# Patient Record
Sex: MALE | Race: WHITE | NOT HISPANIC OR LATINO | URBAN - METROPOLITAN AREA
[De-identification: names, ages, dates, MRNs, and addresses within clinical notes are randomized per-mention and may not be internally consistent; named-entity substitution may affect disease eponyms.]

---

## 2017-10-18 ENCOUNTER — AMBULATORY - HEALTHEAST (OUTPATIENT)
Dept: ADMINISTRATIVE | Facility: CLINIC | Age: 67
End: 2017-10-18

## 2017-10-18 RX ORDER — SIMVASTATIN 20 MG
20 TABLET ORAL AT BEDTIME
Status: SHIPPED | COMMUNITY
Start: 2017-10-18

## 2017-10-18 RX ORDER — LEVOTHYROXINE SODIUM 50 UG/1
50 TABLET ORAL DAILY
Status: SHIPPED | COMMUNITY
Start: 2017-10-18

## 2017-10-18 RX ORDER — DULOXETIN HYDROCHLORIDE 30 MG/1
30 CAPSULE, DELAYED RELEASE ORAL DAILY
Status: SHIPPED | COMMUNITY
Start: 2017-10-18

## 2017-10-18 RX ORDER — MAGNESIUM OXIDE 400 MG/1
400 TABLET ORAL DAILY
Status: SHIPPED | COMMUNITY
Start: 2017-10-18

## 2017-10-18 RX ORDER — ASPIRIN 81 MG/1
81 TABLET, CHEWABLE ORAL DAILY
Status: SHIPPED | COMMUNITY
Start: 2017-10-18

## 2017-10-18 RX ORDER — GABAPENTIN 300 MG/1
300 CAPSULE ORAL 3 TIMES DAILY
Status: SHIPPED | COMMUNITY
Start: 2017-10-18

## 2017-10-18 RX ORDER — FOLIC ACID 1 MG/1
1 TABLET ORAL DAILY
Status: SHIPPED | COMMUNITY
Start: 2017-10-18

## 2017-10-18 RX ORDER — INSULIN GLARGINE 100 [IU]/ML
12 INJECTION, SOLUTION SUBCUTANEOUS DAILY
Status: SHIPPED | COMMUNITY
Start: 2017-10-18

## 2017-10-18 RX ORDER — LANOLIN ALCOHOL/MO/W.PET/CERES
100 CREAM (GRAM) TOPICAL DAILY
Status: SHIPPED | COMMUNITY
Start: 2017-10-18

## 2017-10-20 ENCOUNTER — OFFICE VISIT - HEALTHEAST (OUTPATIENT)
Dept: GERIATRICS | Facility: CLINIC | Age: 67
End: 2017-10-20

## 2017-10-20 DIAGNOSIS — E03.9 HYPOTHYROIDISM: ICD-10-CM

## 2017-10-20 DIAGNOSIS — E10.8 DM TYPE 1 CAUSING COMPLICATION (H): ICD-10-CM

## 2017-10-20 DIAGNOSIS — L97.329: ICD-10-CM

## 2017-10-20 DIAGNOSIS — F32.A DEPRESSION: ICD-10-CM

## 2017-10-20 DIAGNOSIS — E78.5 HYPERLIPIDEMIA: ICD-10-CM

## 2017-10-21 ENCOUNTER — OFFICE VISIT - HEALTHEAST (OUTPATIENT)
Dept: GERIATRICS | Facility: CLINIC | Age: 67
End: 2017-10-21

## 2017-10-21 DIAGNOSIS — E87.5 HYPERKALEMIA: ICD-10-CM

## 2017-10-24 ENCOUNTER — OFFICE VISIT - HEALTHEAST (OUTPATIENT)
Dept: GERIATRICS | Facility: CLINIC | Age: 67
End: 2017-10-24

## 2017-10-24 DIAGNOSIS — E11.9 INSULIN-REQUIRING OR DEPENDENT TYPE II DIABETES MELLITUS (H): ICD-10-CM

## 2017-10-24 DIAGNOSIS — E03.9 HYPOTHYROIDISM: ICD-10-CM

## 2017-10-24 DIAGNOSIS — E11.610 CHARCOT FOOT DUE TO DIABETES MELLITUS (H): ICD-10-CM

## 2017-10-24 DIAGNOSIS — N17.9 AKI (ACUTE KIDNEY INJURY) (H): ICD-10-CM

## 2017-10-24 DIAGNOSIS — L97.329: ICD-10-CM

## 2017-10-24 DIAGNOSIS — Z79.4 INSULIN-REQUIRING OR DEPENDENT TYPE II DIABETES MELLITUS (H): ICD-10-CM

## 2017-10-24 DIAGNOSIS — E78.5 HYPERLIPIDEMIA: ICD-10-CM

## 2017-11-01 ENCOUNTER — OFFICE VISIT - HEALTHEAST (OUTPATIENT)
Dept: GERIATRICS | Facility: CLINIC | Age: 67
End: 2017-11-01

## 2017-11-01 DIAGNOSIS — M86.9 OSTEOMYELITIS (H): ICD-10-CM

## 2017-11-01 DIAGNOSIS — N17.9 AKI (ACUTE KIDNEY INJURY) (H): ICD-10-CM

## 2017-11-01 DIAGNOSIS — R33.9 URINARY RETENTION: ICD-10-CM

## 2017-11-01 DIAGNOSIS — E10.9 TYPE 1 DIABETES MELLITUS (H): ICD-10-CM

## 2017-11-01 DIAGNOSIS — F32.A DEPRESSION: ICD-10-CM

## 2017-11-06 ENCOUNTER — RECORDS - HEALTHEAST (OUTPATIENT)
Dept: LAB | Facility: CLINIC | Age: 67
End: 2017-11-06

## 2017-11-06 LAB
ALT SERPL W P-5'-P-CCNC: 55 U/L (ref 0–45)
AST SERPL W P-5'-P-CCNC: 34 U/L (ref 0–40)
CREAT SERPL-MCNC: 0.99 MG/DL (ref 0.7–1.3)
GFR SERPL CREATININE-BSD FRML MDRD: >60 ML/MIN/1.73M2

## 2017-11-10 ENCOUNTER — OFFICE VISIT - HEALTHEAST (OUTPATIENT)
Dept: GERIATRICS | Facility: CLINIC | Age: 67
End: 2017-11-10

## 2017-11-10 DIAGNOSIS — N40.0 BPH (BENIGN PROSTATIC HYPERPLASIA): ICD-10-CM

## 2017-11-10 DIAGNOSIS — M86.9: ICD-10-CM

## 2017-11-10 DIAGNOSIS — E78.5 HYPERLIPIDEMIA: ICD-10-CM

## 2017-11-10 DIAGNOSIS — E11.9 DIABETES MELLITUS (H): ICD-10-CM

## 2017-11-10 DIAGNOSIS — E03.9 HYPOTHYROIDISM: ICD-10-CM

## 2017-11-10 DIAGNOSIS — R53.1 GENERAL WEAKNESS: ICD-10-CM

## 2017-11-10 DIAGNOSIS — I10 HYPERTENSION: ICD-10-CM

## 2017-11-13 ENCOUNTER — RECORDS - HEALTHEAST (OUTPATIENT)
Dept: LAB | Facility: CLINIC | Age: 67
End: 2017-11-13

## 2017-11-13 LAB
ALT SERPL W P-5'-P-CCNC: 36 U/L (ref 0–45)
AST SERPL W P-5'-P-CCNC: 33 U/L (ref 0–40)
CREAT SERPL-MCNC: 1.03 MG/DL (ref 0.7–1.3)
GFR SERPL CREATININE-BSD FRML MDRD: >60 ML/MIN/1.73M2

## 2017-11-14 ENCOUNTER — OFFICE VISIT - HEALTHEAST (OUTPATIENT)
Dept: GERIATRICS | Facility: CLINIC | Age: 67
End: 2017-11-14

## 2017-11-14 DIAGNOSIS — M86.9: ICD-10-CM

## 2017-11-14 DIAGNOSIS — I10 HYPERTENSION: ICD-10-CM

## 2017-11-14 DIAGNOSIS — R53.81 PHYSICAL DEBILITY: ICD-10-CM

## 2017-11-14 DIAGNOSIS — E11.9 DIABETES MELLITUS (H): ICD-10-CM

## 2017-11-20 ENCOUNTER — RECORDS - HEALTHEAST (OUTPATIENT)
Dept: LAB | Facility: CLINIC | Age: 67
End: 2017-11-20

## 2017-11-20 LAB
ALT SERPL W P-5'-P-CCNC: 40 U/L (ref 0–45)
AST SERPL W P-5'-P-CCNC: 32 U/L (ref 0–40)
CREAT SERPL-MCNC: 1.1 MG/DL (ref 0.7–1.3)
GFR SERPL CREATININE-BSD FRML MDRD: >60 ML/MIN/1.73M2

## 2017-11-21 ENCOUNTER — OFFICE VISIT - HEALTHEAST (OUTPATIENT)
Dept: GERIATRICS | Facility: CLINIC | Age: 67
End: 2017-11-21

## 2017-11-21 DIAGNOSIS — E11.9 DIABETES MELLITUS (H): ICD-10-CM

## 2017-11-21 DIAGNOSIS — R53.81 PHYSICAL DEBILITY: ICD-10-CM

## 2017-11-21 DIAGNOSIS — M86.9: ICD-10-CM

## 2017-11-21 DIAGNOSIS — I10 HYPERTENSION: ICD-10-CM

## 2017-11-24 ENCOUNTER — OFFICE VISIT - HEALTHEAST (OUTPATIENT)
Dept: GERIATRICS | Facility: CLINIC | Age: 67
End: 2017-11-24

## 2017-11-24 DIAGNOSIS — E11.9 DIABETES MELLITUS (H): ICD-10-CM

## 2017-11-24 DIAGNOSIS — M86.9 OSTEOMYELITIS OF ANKLE OR FOOT: ICD-10-CM

## 2017-11-24 DIAGNOSIS — G62.9 NEUROPATHY: ICD-10-CM

## 2017-11-24 DIAGNOSIS — I10 HYPERTENSION: ICD-10-CM

## 2017-11-27 ENCOUNTER — RECORDS - HEALTHEAST (OUTPATIENT)
Dept: LAB | Facility: CLINIC | Age: 67
End: 2017-11-27

## 2017-11-27 LAB
ALT SERPL W P-5'-P-CCNC: 45 U/L (ref 0–45)
AST SERPL W P-5'-P-CCNC: 38 U/L (ref 0–40)
CREAT SERPL-MCNC: 1.01 MG/DL (ref 0.7–1.3)
GFR SERPL CREATININE-BSD FRML MDRD: >60 ML/MIN/1.73M2

## 2017-11-28 ENCOUNTER — OFFICE VISIT - HEALTHEAST (OUTPATIENT)
Dept: GERIATRICS | Facility: CLINIC | Age: 67
End: 2017-11-28

## 2017-11-28 DIAGNOSIS — I10 HYPERTENSION: ICD-10-CM

## 2017-11-28 DIAGNOSIS — E11.9 DIABETES MELLITUS (H): ICD-10-CM

## 2017-11-28 DIAGNOSIS — G62.9 NEUROPATHY: ICD-10-CM

## 2017-11-28 DIAGNOSIS — M86.9 OSTEOMYELITIS OF ANKLE OR FOOT: ICD-10-CM

## 2017-12-01 ENCOUNTER — OFFICE VISIT - HEALTHEAST (OUTPATIENT)
Dept: GERIATRICS | Facility: CLINIC | Age: 67
End: 2017-12-01

## 2017-12-01 DIAGNOSIS — I10 HYPERTENSION: ICD-10-CM

## 2017-12-01 DIAGNOSIS — R53.1 GENERAL WEAKNESS: ICD-10-CM

## 2017-12-01 DIAGNOSIS — M86.9 OSTEOMYELITIS OF ANKLE OR FOOT: ICD-10-CM

## 2017-12-01 DIAGNOSIS — E11.9 DIABETES MELLITUS (H): ICD-10-CM

## 2017-12-04 ENCOUNTER — RECORDS - HEALTHEAST (OUTPATIENT)
Dept: LAB | Facility: CLINIC | Age: 67
End: 2017-12-04

## 2017-12-04 LAB
ALT SERPL W P-5'-P-CCNC: 48 U/L (ref 0–45)
AST SERPL W P-5'-P-CCNC: 35 U/L (ref 0–40)
CREAT SERPL-MCNC: 1.2 MG/DL (ref 0.7–1.3)
GFR SERPL CREATININE-BSD FRML MDRD: 60 ML/MIN/1.73M2

## 2017-12-05 ENCOUNTER — OFFICE VISIT - HEALTHEAST (OUTPATIENT)
Dept: GERIATRICS | Facility: CLINIC | Age: 67
End: 2017-12-05

## 2017-12-05 DIAGNOSIS — E11.9 DIABETES MELLITUS (H): ICD-10-CM

## 2017-12-05 DIAGNOSIS — M86.9 OSTEOMYELITIS OF ANKLE OR FOOT: ICD-10-CM

## 2017-12-05 DIAGNOSIS — I10 HYPERTENSION: ICD-10-CM

## 2017-12-05 DIAGNOSIS — R53.1 GENERAL WEAKNESS: ICD-10-CM

## 2017-12-07 ENCOUNTER — AMBULATORY - HEALTHEAST (OUTPATIENT)
Dept: GERIATRICS | Facility: CLINIC | Age: 67
End: 2017-12-07

## 2021-05-31 VITALS — WEIGHT: 154.9 LBS

## 2021-06-13 NOTE — PROGRESS NOTES
Slightly high potassium of 5.1 noted from basic metabolic panel yesterday.  Will order repeat BMP for 10/23/2017 for follow-up evaluation.  Renal function noted to be good.  Glucose is mildly elevated.  No change in treatment plan for now.

## 2021-06-13 NOTE — PROGRESS NOTES
[facility]      Facility:    [facility] [[id number]]  Code Status: FULL CODE      Chief Complaint/Reason for Visit:  Chief Complaint   Patient presents with     H & P       HPI:   Juan is a 67 y.o. male who was admitted to [facility] on October 14 and transferred to this facility in October 17, 2017.  He is a very nice 67-year-old male with diabetes insulin requiring, depression, hypothyroidism and hyperlipidemia.  He is a previous smoker and an alcoholic beverage drinker and quit these habits many years ago.  He currently lives with his daughter.  He is retired.  He was admitted on account of symptoms consistent with diabetic ketoacidosis.  He was nauseated vomiting and was dehydrated.    This happened in the background of his stopping his depression medication as well as insulin in the days prior to his admission.    He fulfills the criteria for diabetic ketoacidosis on admission.  He was treated aggressively with IV fluids and electrolyte support and resumed his insulin regimen.  Endocrine saw her in and recommended to continue Lantus 22 units at noon and 8 units of NovoLog 3 times daily with meals.    He suffered from acute kidney injury with creatinine of 2.3 on admission but this improved with hydration coming back down to normal levels on discharge.    He also has been having issues with an ulcer on the left ankle for 2 weeks prior to his admission.  He normally sees Dr. Spain his podiatrist.  Was seen in the hospital, and in fact is being planned for debridement procedure on Friday, 10/27/2017.    Previous surgeries include left great toe amputation and previous debridements on nonhealing wounds.  No easy bruisability no bleeding tendencies.  No history of untoward reaction to anesthesia.  Family history include coronary artery disease with his father dying at the age of 63 had breast cancer in his mom at age of 56.    He himself does not have any  history of coronary artery disease stroke aortic disease.    He does do pretty well with stairs not having dyspnea on exertion with one flight of stairs.  No chest pains no palpitations.  Appetite is good.  No urinary issues.  He takes a baby aspirin once a day for cardioprotection.  He is transferred here on cephalexin 500 mg 4 times a day for that left ankle ulcer/infection.      Since he has been here, he has had several hypoglycemic episodes typically at dinnertime at about 3 to 4:00 in the afternoon.  [address] down to 54 to other times they were in the mid 90s and one time it was 180.  Otherwise fasting lunch and at bedtime blood sugars fluctuate between 91 2/200.    He went to see his foot and ankle provider today and confirmed the scheduled surgery for October 27 with an arrival time of 12:30 PM.              Past Medical History:  Past Medical History:   Diagnosis Date     Acute renal failure      Depression      Diabetes mellitus type 1      Diabetic ulcer of left foot      DKA (diabetic ketoacidosis)      History of alcohol abuse      HLD (hyperlipidemia)      Hyperkalemia      Hypothyroidism      Metabolic encephalopathy            Surgical History:  Past Surgical History:   Procedure Laterality Date     TOE AMPUTATION Left        Family History:   Family History   Problem Relation Age of Onset     No Medical Problems Mother      No Medical Problems Father      No Medical Problems Sister      No Medical Problems Brother      No Medical Problems Maternal Grandmother      Glaucoma Paternal Uncle      Macular degeneration Paternal Uncle        Social History:    Social History     Social History     Marital status:      Spouse name: N/A     Number of children: N/A     Years of education: N/A     Social History Main Topics     Smoking status: Former Smoker     Smokeless tobacco: Never Used     Alcohol use No     Drug use: Yes     Special: Marijuana     Sexual activity: Not on file     Other  Topics Concern     Not on file     Social History Narrative     No narrative on file          Review of Systems  As above otherwise unremarkable  There were no vitals filed for this visit.    Physical Exam  Vital signs noted and stable.  Other than hypoglycemic spells as noted above in the HPI.  Patient is alert, awake, oriented to time, place and person, not in acute cardiorespiratory distress  Skin: Warm, and moist,  no lesions,   Head: atraumatic, normocephalic,   Eyes: EOM's intact and conjugate, pink palpebral conjunctivae, anicteric sclerae, pupils reactive  Lungs : Clear to auscultation , no crackles, wheezes or rhonchi  Heart : Nornal first and second heart sounds, No murmurs, heaves, or thrills  Abdomen: Soft, non tender, non distended, no hepatosplenomegaly, no ascites  Extremities: Bipedal pitting edema, pulses are not felt readily because of edema.  But present I did not directly observe the left ankle ulcer as he was on his way to his appointment to see the foot and ankle doctor in he did not prefer that I undressed his wound        Medication List:  Current Outpatient Prescriptions   Medication Sig     aspirin 81 mg chewable tablet Chew 81 mg daily.     cephalexin (KEFLEX) 500 MG capsule Take 500 mg by mouth 4 (four) times a day.     cholecalciferol, vitamin D3, 1,000 unit tablet Take 1,000 Units by mouth 2 (two) times a day.     dimethicone 1.5 % Crea Apply 1 application topically 2 (two) times a day.     DULoxetine (CYMBALTA) 30 MG capsule Take 30 mg by mouth daily.     folic acid (FOLVITE) 1 MG tablet Take 1 mg by mouth daily.     gabapentin (NEURONTIN) 300 MG capsule Take 300 mg by mouth 3 (three) times a day.     glucagon, human recombinant, 1 mg injection Inject 1 mg as directed once as needed.     insulin aspart (NOVOLOG) 100 unit/mL injection Inject 8 Units under the skin 3 (three) times a day.     insulin aspart (NOVOLOG) 100 unit/mL injection Inject under the skin. Per Sliding Scale; If Blood  Sugar is less than 150, call MD.  If Blood Sugar is 151 to 200, give 1 Units.  If Blood Sugar is 201 to 250, give 2 Units.  If Blood Sugar is 251 to 300, give 3 Units.  If Blood Sugar is greater than 300, give 4 Units.     insulin glargine (LANTUS) 100 unit/mL injection Inject 22 Units under the skin daily.     levothyroxine (SYNTHROID, LEVOTHROID) 50 MCG tablet Take 50 mcg by mouth daily.     magnesium oxide (MAG-OX) 400 mg tablet Take 400 mg by mouth daily.     simvastatin (ZOCOR) 20 MG tablet Take 20 mg by mouth at bedtime.     thiamine 100 MG tablet Take 100 mg by mouth daily.       Labs:  Recent Results (from the past 72 hour(s))   Basic Metabolic Panel   Result Value Ref Range    Sodium 135 (L) 136 - 145 mmol/L    Potassium 5.0 3.5 - 5.0 mmol/L    Chloride 99 98 - 107 mmol/L    CO2 30 22 - 31 mmol/L    Anion Gap, Calculation 6 5 - 18 mmol/L    Glucose 391 (H) 70 - 125 mg/dL    Calcium 9.3 8.5 - 10.5 mg/dL    BUN 20 8 - 22 mg/dL    Creatinine 1.14 0.70 - 1.30 mg/dL    GFR MDRD Af Amer >60 >60 mL/min/1.73m2    GFR MDRD Non Af Amer >60 >60 mL/min/1.73m2         Assessment:    ICD-10-CM    1. Ulcer of left ankle L97.329    2. Insulin-requiring or dependent type II diabetes mellitus E11.9     Z79.4    3. ROB (acute kidney injury) N17.9    4. Hypothyroidism E03.9    5. Hyperlipidemia E78.5    6. Charcot foot due to diabetes mellitus E11.610        Plan:  He is going for debridement of the left ankle ulcer on October 27.  He is cleared for this since surgery with no perioperative cardiac pulmonary in renal complication.    I reviewed the results of his latest BMP.  Reviewed the results of CBC that was done in the hospital they are acceptable.  Check a BMP soon after surgery pain close attention to potassium and creatinine.  Also perioperative glucose will need to be monitored closely.  And placed back on preoperative Lantus and NovoLog regimen.  As soon as he starts eating  Check an EKG today  We will send off his  most recent CBC done at [facility] on review of my records, hemoglobin and platelet is adequate.  He has been getting hypoglycemic episodes at dinnertime I will therefore decrease his noontime NovoLog from 8 units to 4 units  Continue with 22 units of Lantus a noon except for the night of October 26 when he will be getting 16 units instead of 22.  On the day of surgery, he should not be getting any short acting insulin  No Lantus on the day of surgery as well.  Okay to continue all other medications other than aspirin which will be held until after the surgery.  Take the rest of the medication with small sips of water on the day of surgery.  N.p.o. after midnight 10/26/2017.      Total time spent was 60 minutes with more than 50% spent on counseling, discussion of treatment plan and extensive review of available records  This note has been dictated using voice recognition software. Any grammatical, typographical, or context distortions are unintentional.            Electronically signed by: Fabio Oneil MD

## 2021-06-13 NOTE — PROGRESS NOTES
[facility]      Facility:    [facility] [[id number]]  Code Status: FULL CODE      Chief Complaint/Reason for Visit:  Chief Complaint   Patient presents with     H & P       HPI:   Juan is a 67 y.o. male who was admitted to [facility] on October 14 and transferred to this facility in October 17, 2017.  He is a very nice 67-year-old male with diabetes insulin requiring, depression, hypothyroidism and hyperlipidemia.  He is a previous smoker and an alcoholic beverage drinker and quit these habits many years ago.  He currently lives with his daughter.  He is retired.  He was admitted on account of symptoms consistent with diabetic ketoacidosis.  He was nauseated vomiting and was dehydrated.     This happened in the background of his stopping his depression medication as well as insulin in the days prior to his admission.     He fulfills the criteria for diabetic ketoacidosis on admission.  He was treated aggressively with IV fluids and electrolyte support and resumed his insulin regimen.  Endocrine saw her in and recommended to continue Lantus 22 units at noon and 8 units of NovoLog 3 times daily with meals.     He suffered from acute kidney injury with creatinine of 2.3 on admission but this improved with hydration coming back down to normal levels on discharge.     He also has been having issues with an ulcer on the left ankle for 2 weeks prior to his admission.  He normally sees Dr. Spain his podiatrist.  Was seen in the hospital, and in fact is being planned for debridement procedure on Friday, 10/27/2017.     Previous surgeries include left great toe amputation and previous debridements on nonhealing wounds.  No easy bruisability no bleeding tendencies.  No history of untoward reaction to anesthesia.  Family history include coronary artery disease with his father dying at the age of 63 had breast cancer in his mom at age of 56.     He himself does not have  any history of coronary artery disease stroke aortic disease.     He does do pretty well with stairs not having dyspnea on exertion with one flight of stairs.  No chest pains no palpitations.  Appetite is good.  No urinary issues.  He takes a baby aspirin once a day for cardioprotection.  He is transferred here on cephalexin 500 mg 4 times a day for that left ankle ulcer/infection.        Since he has been here, he has had several hypoglycemic episodes typically at dinnertime at about 3 to 4:00 in the afternoon.  1 Time It [address] down to 54 to other times they were in the mid 90s and one time it was 180.  Otherwise fasting lunch and at bedtime blood sugars fluctuate between 91 2/200.     He went to see his foot and ankle provider is who planned for a left distal fibula culture, incision and drainage in the left foot/ankle and partial resection of fibula for pressure reduction and excision of chronic wound.  A flap closure was also performed.    In the perioperative period, he suffered from acute kidney injury and urinary retention.  Alfuzosin and finasteride were both started.  Which improved urinary stream significantly.  He had to be straight cathed twice in the hospital but none since he has been back.  He was placed on carb-based insulin regimen plus his Lantus split between the morning and night in the hospital, however he is not knowledgeable about carb based insulin regimen and will not be able to follow through with this regimen at home.  In the hospital even with this regimen he was still having highs and lows and sometimes goes even in the 40s-50s.  He is not particularly excited about this idea and would like to get back to his old regimen of Lantus plus pre-meal short acting insulin.  He has not had any good bowel movements in the last 2 days.    Really not having any too much pain and he has not been asking for his narcotic pain medication.  He was placed on antibiotics Rocephin and this will go on for 6  weeks secondary to bone biopsy showing signs of osteomyelitis.    Was placed on aspirin for DVT prophylaxis and this will go on for 42 days.    No other symptoms noted unremarkable except for those noted in the HPI              Past Medical History:  Past Medical History:   Diagnosis Date     Acute renal failure      Depression      Diabetes mellitus type 1      Diabetic ulcer of left foot      DKA (diabetic ketoacidosis)      History of alcohol abuse      HLD (hyperlipidemia)      Hyperkalemia      Hypothyroidism      Metabolic encephalopathy            Surgical History:  Past Surgical History:   Procedure Laterality Date     TOE AMPUTATION Left        Family History:   Family History   Problem Relation Age of Onset     No Medical Problems Mother      No Medical Problems Father      No Medical Problems Sister      No Medical Problems Brother      No Medical Problems Maternal Grandmother      Glaucoma Paternal Uncle      Macular degeneration Paternal Uncle        Social History:    Social History     Social History     Marital status:      Spouse name: N/A     Number of children: N/A     Years of education: N/A     Social History Main Topics     Smoking status: Former Smoker     Smokeless tobacco: Never Used     Alcohol use No     Drug use: Yes     Special: Marijuana     Sexual activity: Not on file     Other Topics Concern     Not on file     Social History Narrative     No narrative on file          Review of Systems  Unremarkable except for those noted in the HPI  There were no vitals filed for this visit.    Physical Exam    Medicat vital signs noted.  Blood sugar this a.m. was 169.  No carb-based insulin was given for that.    Patient is alert, awake, oriented to time, place and person, not in acute cardiorespiratory distress  Skin: Warm, and moist,  no lesions,   Head: atraumatic, normocephalic,   Eyes: EOM's intact and conjugate, pink palpebral conjunctivae, anicteric sclerae, pupils reactive  Lungs :  Clear to auscultation , no crackles, wheezes or rhonchi  Heart : Nornal first and second heart sounds, No murmurs, heaves, or thrills  Abdomen: Soft, non tender, non distended, no hepatosplenomegaly, no ascites  Extremities: Left foot was examined.  Sutures are intact.  No active drainage noted.  No edema, pulses are full and equal    Ion List:  Current Outpatient Prescriptions   Medication Sig     aspirin 81 mg chewable tablet Chew 81 mg daily.     cholecalciferol, vitamin D3, 1,000 unit tablet Take 1,000 Units by mouth 2 (two) times a day.     dimethicone 1.5 % Crea Apply 1 application topically 2 (two) times a day.     DULoxetine (CYMBALTA) 30 MG capsule Take 30 mg by mouth daily.     folic acid (FOLVITE) 1 MG tablet Take 1 mg by mouth daily.     gabapentin (NEURONTIN) 300 MG capsule Take 300 mg by mouth 3 (three) times a day.     glucagon, human recombinant, 1 mg injection Inject 1 mg as directed once as needed.     insulin aspart (NOVOLOG) 100 unit/mL injection Inject 8 Units under the skin 3 (three) times a day.     insulin aspart (NOVOLOG) 100 unit/mL injection Inject under the skin. Per Sliding Scale; If Blood Sugar is less than 150, call MD.  If Blood Sugar is 151 to 200, give 1 Units.  If Blood Sugar is 201 to 250, give 2 Units.  If Blood Sugar is 251 to 300, give 3 Units.  If Blood Sugar is greater than 300, give 4 Units.     insulin glargine (LANTUS) 100 unit/mL injection Inject 22 Units under the skin daily.     levothyroxine (SYNTHROID, LEVOTHROID) 50 MCG tablet Take 50 mcg by mouth daily.     magnesium oxide (MAG-OX) 400 mg tablet Take 400 mg by mouth daily.     simvastatin (ZOCOR) 20 MG tablet Take 20 mg by mouth at bedtime.     thiamine 100 MG tablet Take 100 mg by mouth daily.       Labs:  No results found for this or any previous visit (from the past 72 hour(s)).      Assessment:    ICD-10-CM    1. Osteomyelitis M86.9    2. Urinary retention R33.9    3. Type 1 diabetes mellitus E10.9    4. ROB  (acute kidney injury) N17.9    5. Depression F32.9        Plan:  Continue with Rocephin for a total of 6 weeks.  Strictly no weightbearing to allow maximum healing potential  Discontinue carb based short acting insulin pre-meal.  He will be getting 8 units of NovoLog at breakfast 4 units at lunch and 8 units at dinnertime in addition to his Lantus which was split into 11 units in the morning and 11 units at night  Follow-up on kidney function testing done today.  He has been urinating quite well and spontaneously without bladder retention.  Urine color is clear continue with premorbid  Medications for depression.  Tylenol as needed for pain.  Senna/docusate 1 tab daily as needed    Total time spent was 60 minutes with more than 50% spent on counseling, discussion of treatment plan and extensive review of available records  This note has been dictated using voice recognition software. Any grammatical, typographical, or context distortions are unintentional.          Electronically signed by: Fabio Oneil MD

## 2021-06-13 NOTE — PROGRESS NOTES
[facility]    Facility:   [facility] [[id number]]      Chief Complaint   Patient presents with     H & P     Face-to-face encounter done with patient on 10/19/2017.    History of Present Illness: Patient was discharged from hospital to the TCU, after management for metabolic encephalopathy related to diabetic ketoacidosis.  Patient had evidence of poor blood glucose control with a high hemoglobin A1c noted.  He responded well to treatment in hospital, with patient telling me he feels like his thinking is much clearer now.  While in the hospital, patient was also noted to have a high potassium level, and acute kidney injury.  He was noted to have an ulcer on his lateral left ankle.  He does not have pain associated with this at time of my exam.  He states the ulcer is likely related to the boot he wears for unloading, which he wears on the foot that he has a history of Charcot foot.  At time of my exam and visit with patient, patient had a great attitude, telling me he realizes he needs therapy, especially for strengthening of his legs.  He has tended to spend most of his time inside the home in which he lives, and knows he needs to get out more to socialize and exercise.  He feels very motivated to improve his health can still be involved with his family.  He has no acute concerns from when he was admitted to the transitional care unit.  He is eating and drinking well.  No problems with urination or bowel movements.  No complaint of pain that is not being well controlled.  No respiratory complaints.  He normally lives independently.  He denies any recent ingestion of alcohol, or smoking of marijuana.  He is a former smoker tobacco.  I spoke with patient about his dosage of Cymbalta which he normally takes.  He states this is normally 60 mg daily.    Review of systems: See history of present illness, otherwise negative.     Current Iutpatient Prescriptions   Medication  Sig Dispense Refill     aspirin 81 mg chewable tablet Chew 81 mg daily.       cephalexin (KEFLEX) 500 MG capsule Take 500 mg by mouth 4 (four) times a day.       cholecalciferol, vitamin D3, 1,000 unit tablet Take 1,000 Units by mouth 2 (two) times a day.       dimethicone 1.5 % Crea Apply 1 application topically 2 (two) times a day.       DULoxetine (CYMBALTA) 30 MG capsule Take 30 mg by mouth daily.       folic acid (FOLVITE) 1 MG tablet Take 1 mg by mouth daily.       gabapentin (NEURONTIN) 300 MG capsule Take 300 mg by mouth 3 (three) times a day.       glucagon, human recombinant, 1 mg injection Inject 1 mg as directed once as needed.       insulin aspart (NOVOLOG) 100 unit/mL injection Inject 8 Units under the skin 3 (three) times a day.       insulin aspart (NOVOLOG) 100 unit/mL injection Inject under the skin. Per Sliding Scale; If Blood Sugar is less than 150, call MD.  If Blood Sugar is 151 to 200, give 1 Units.  If Blood Sugar is 201 to 250, give 2 Units.  If Blood Sugar is 251 to 300, give 3 Units.  If Blood Sugar is greater than 300, give 4 Units.       insulin glargine (LANTUS) 100 unit/mL injection Inject 22 Units under the skin daily.       levothyroxine (SYNTHROID, LEVOTHROID) 50 MCG tablet Take 50 mcg by mouth daily.       magnesium oxide (MAG-OX) 400 mg tablet Take 400 mg by mouth daily.       simvastatin (ZOCOR) 20 MG tablet Take 20 mg by mouth at bedtime.       thiamine 100 MG tablet Take 100 mg by mouth daily.         Past Medical History:   Diagnosis Date     Acute renal failure      Depression      Diabetes mellitus type 1      Diabetic ulcer of left foot      DKA (diabetic ketoacidosis)      History of alcohol abuse      HLD (hyperlipidemia)      Hyperkalemia      Hypothyroidism      Metabolic encephalopathy       Past Surgical History:   Procedure Laterality Date     TOE AMPUTATION Left       Social History     Social History     Marital status:      Spouse name: N/A     Number of  children: N/A     Years of education: N/A     Social History Main Topics     Smoking status: Former Smoker     Smokeless tobacco: Never Used     Alcohol use No     Drug use: Yes     Special: Marijuana     Sexual activity: Not on file     Other Topics Concern     Not on file     Social History Narrative     No narrative on file       History   Smoking Status     Former Smoker   Smokeless Tobacco     Never Used      Exam:   Blood pressure 118/77, pulse 94, temperature 97.8  F (36.6  C), resp. rate 20, weight 154 lb 14.4 oz (70.3 kg).    EXAM:  General: Vital signs reviewed. Patient is in no acute appearing distress when examined with him sitting in wheelchair.  He has a very pleasant affect, with clear fluent speech.  He is oriented to the year, month, and location.  Breathing is non labored appearing. Patient is alert and oriented x 3.   Eyes: no sclearal discoloration. PERRL with normal consensual gaze.  Mouth: oral mucosa is moist with no inner oral abnormalities noted.  Neck supple  Heart: Normal rate and rhythm without murmur  Lungs: Clear to auscultation with good air flow bilaterally.  Abdomen soft, non tender, no abnormal masses.  Skin/extremities: Skin is warm and dry.  There is a bandage over the skin ulcer of left lateral ankle, which has a small amount of drainage on it.  This was left in place, with nursing changing the dressing daily.  Patient has mild edema, about 1.5 mm pitting edema to the left foot and ankle which is nontender.  No erythema or other discoloration noted.  No edema noted to the right lower extremity.  Patient has a faint palpable dorsalis pedis pulse in both feet.  Patient is noted to have muscle atrophy of his bilateral lower extremity muscle groups.  Psych: Patient has a normal pleasant affect, and answers questions appropriately.  Neuro no focal neurological deficits.  I reviewed patient's recent blood glucose levels.  They range from the 50s, to the 300s.  He currently has sliding  scale insulin coverage for management of glucose spikes, and glucagon if he should ever become symptomatic of a low blood sugar glucose.  Assessment/Plan   1. DM type 1 causing complication      Reason metabolic encephalopathy due to diabetic ketoacidosis.   2. Ulcer of left ankle     3. Hypothyroidism     4. Hyperlipidemia     5. Depression         Patient Instructions   I reviewed the medication list from admission with nursing staff.  According to current orders, the Cymbalta dosage would go from 30 mg daily to 90 mg daily with the taking of both the 30 mg capsule and 60 mg capsule.  I discontinued this order, with a new order to increase dosage from 30 mg daily to 60 mg daily on 10/22/2017.  An order was also placed to do a basic metabolic panel for follow-up of his acute kidney injury, to be done on 10/2017.  Otherwise, we will continue current management ordered at time of admission to the TCU.     Bert Hobbs DO      Electronically signed by: Bert Hobbs DO

## 2021-06-14 NOTE — PROGRESS NOTES
[facility]    Facility:   ANSWER ROOMING ACTIVITY QUESTION   Code Status: FULL CODE      CHIEF COMPLAINT/REASON FOR VISIT:  Chief Complaint   Patient presents with     Problem Visit     asked to see       HISTORY:      HPI: Juan is a 67 y.o. male who I was asked to see secondary to his chronic medical conditions in particular his blood sugars.  They have been occasionally labile sweat a chance to talk with him about that and looking through his chart and the most recent sugars going back even a couple of weeks it does seem more that the evening time sugars are starting to climb and increase were as the morning sugars seem to be controlled the afternoon sugars seem to be okay and he states that this is pretty much normal for him and also keeping in mind that I am not sure staff was aware until now that he did a couple weeks ago to see his endocrinologist with a recently increased the Lantus 12 units in the morning 9 units with breakfast 4 units with lunch 8 units with dinner and they recently called endocrinologist and they decreased that NovoLog 3 units at lunchtime.  So I had a chance to talk about those numbers plus he is on sliding scale so therefore I still would like to see the evening sugars come down but will see how this goes and staff will keep me updated but certainly the endocrinology group also updated regarding the evening sugars.  He otherwise has been asymptomatic.  He has been normotensive and afebrile and also on room air.  He does have osteomyelitis his next visit with the surgeon is I think next week for recheck he is hoping to be able to get increased activities and decreased restriction so he can continue her least start therapy again.  He has developed some deconditioning and weakness due to nonuse he does self propel his wheelchair.  His pain is well managed.  Sleeping well at night.  No appetite changes.    Past Medical History:   Diagnosis Date     Acute renal  failure      Depression      Diabetes mellitus type 1      Diabetic ulcer of left foot      DKA (diabetic ketoacidosis)      History of alcohol abuse      HLD (hyperlipidemia)      Hyperkalemia      Hypothyroidism      Metabolic encephalopathy              Family History   Problem Relation Age of Onset     No Medical Problems Mother      No Medical Problems Father      No Medical Problems Sister      No Medical Problems Brother      No Medical Problems Maternal Grandmother      Glaucoma Paternal Uncle      Macular degeneration Paternal Uncle      Social History     Social History     Marital status:      Spouse name: N/A     Number of children: N/A     Years of education: N/A     Social History Main Topics     Smoking status: Former Smoker     Smokeless tobacco: Never Used     Alcohol use No     Drug use: Yes     Special: Marijuana     Sexual activity: Not on file     Other Topics Concern     Not on file     Social History Narrative     No narrative on file         Review of Systems  Currently denies chills fever coughing wheezing chest pain dizziness or vertigo nausea vomiting diarrhea flulike symptoms rashes sore stiff neck or swollen glands.  History of diabetes hypertension hypothyroidism BPH depression hyperlipidemia      Current Outpatient Prescriptions:      aspirin 81 mg chewable tablet, Chew 81 mg daily., Disp: , Rfl:      cholecalciferol, vitamin D3, 1,000 unit tablet, Take 1,000 Units by mouth 2 (two) times a day., Disp: , Rfl:      dimethicone 1.5 % Crea, Apply 1 application topically 2 (two) times a day., Disp: , Rfl:      DULoxetine (CYMBALTA) 30 MG capsule, Take 30 mg by mouth daily., Disp: , Rfl:      folic acid (FOLVITE) 1 MG tablet, Take 1 mg by mouth daily., Disp: , Rfl:      gabapentin (NEURONTIN) 300 MG capsule, Take 300 mg by mouth 3 (three) times a day., Disp: , Rfl:      glucagon, human recombinant, 1 mg injection, Inject 1 mg as directed once as needed., Disp: , Rfl:      insulin  aspart (NOVOLOG) 100 unit/mL injection, Inject 8 Units under the skin 3 (three) times a day., Disp: , Rfl:      insulin aspart (NOVOLOG) 100 unit/mL injection, Inject under the skin. Per Sliding Scale; If Blood Sugar is less than 150, call MD. If Blood Sugar is 151 to 200, give 1 Units. If Blood Sugar is 201 to 250, give 2 Units. If Blood Sugar is 251 to 300, give 3 Units. If Blood Sugar is greater than 300, give 4 Units., Disp: , Rfl:      insulin glargine (LANTUS) 100 unit/mL injection, Inject 22 Units under the skin daily., Disp: , Rfl:      levothyroxine (SYNTHROID, LEVOTHROID) 50 MCG tablet, Take 50 mcg by mouth daily., Disp: , Rfl:      magnesium oxide (MAG-OX) 400 mg tablet, Take 400 mg by mouth daily., Disp: , Rfl:      simvastatin (ZOCOR) 20 MG tablet, Take 20 mg by mouth at bedtime., Disp: , Rfl:      thiamine 100 MG tablet, Take 100 mg by mouth daily., Disp: , Rfl:   Blood pressure 137/83 pulse 83 respirations 18 temperature 96.7 saturation room air  .There were no vitals filed for this visit.  98%  Physical Exam  Constitutional: He is oriented to person, place, and time. No distress.   HENT:   Head: Normocephalic.    Neck: Neck supple. No thyromegaly present.   Cardiovascular: Normal rate, regular rhythm and normal heart sounds.    Pulmonary/Chest: Breath sounds normal.   Abdominal: . There is no tenderness. There is no guarding.   Musculoskeletal:   Left foot does have good CMS he does have a amputated great toe.  No current secondary infection.  PICC line right bicep.  Able to work on therapy.  Pain is managed   Lymphadenopathy:     He has no cervical adenopathy.   Neurological: He is oriented to person, place, and time.           LABS:   Lab Results   Component Value Date    WBC 3.8 (L) 11/27/2017    HGB 10.4 (L) 11/27/2017    HCT 31.9 (L) 11/27/2017    MCV 88 11/27/2017     11/27/2017     No results found for: HGBA1C  Results for orders placed or performed in visit on 11/06/17   Basic  Metabolic Panel   Result Value Ref Range    Sodium 139 136 - 145 mmol/L    Potassium 4.6 3.5 - 5.0 mmol/L    Chloride 103 98 - 107 mmol/L    CO2 28 22 - 31 mmol/L    Anion Gap, Calculation 8 5 - 18 mmol/L    Glucose 53 (LL) 70 - 125 mg/dL    Calcium 9.1 8.5 - 10.5 mg/dL    BUN 25 (H) 8 - 22 mg/dL    Creatinine 0.99 0.70 - 1.30 mg/dL    GFR MDRD Af Amer >60 >60 mL/min/1.73m2    GFR MDRD Non Af Amer >60 >60 mL/min/1.73m2           ASSESSMENT:      ICD-10-CM    1. Diabetes mellitus E11.9    2. Osteomyelitis of ankle or foot M86.9    3. Hypertension I10    4. General weakness R53.1        PLAN:    No further changes at this time.  I do believe he does see the surgeon again next week for recheck.  Staff will keep me updated as any other problems or concerns.     .    Electronically signed by: Michael Duane Johnson, CNP

## 2021-06-14 NOTE — PROGRESS NOTES
[facility]    Facility:   [facility] [[id number]]   Code Status: FULL CODE      CHIEF COMPLAINT/REASON FOR VISIT:  Chief Complaint   Patient presents with     Follow Up     foot, abx       HISTORY:      HPI: Juan is a 67 y.o. male who I had the pleasure to visit with today secondary to his multiple chronic medical conditions including his most recent hospitalization October 27 - October 31 secondary to chronic osteomyelitis left ankle wound and abscess.  He does have a PICC line getting IV antibiotics and he does get his weekly laboratory studies see results below.  He overall feels like he is doing pretty good he has been afebrile.  He has been normotensive.  His blood sugars for the most part have been pretty well controlled there were a couple elevated ones in the upper 200 range not sure why that occurred but a lot of them have been ranging anywhere from 82-1 50.  Otherwise he has been asymptomatic his appetite is good.  Doing what he can in therapy otherwise nonambulatory with the left leg.  Does require assistance with some basic ADLs.  Apparently he is seeing Endocrinology today regarding his normal follow-up for diabetes and his thyroid.  Next week he again does see infectious disease and the surgeon.  I believe the antibiotics will run through December 8.  Appetite good.  No pain.    Past Medical History:   Diagnosis Date     Acute renal failure      Depression      Diabetes mellitus type 1      Diabetic ulcer of left foot      DKA (diabetic ketoacidosis)      History of alcohol abuse      HLD (hyperlipidemia)      Hyperkalemia      Hypothyroidism      Metabolic encephalopathy              Family History   Problem Relation Age of Onset     No Medical Problems Mother      No Medical Problems Father      No Medical Problems Sister      No Medical Problems Brother      No Medical Problems Maternal Grandmother      Glaucoma Paternal Uncle      Macular degeneration  Paternal Uncle      Social History     Social History     Marital status:      Spouse name: N/A     Number of children: N/A     Years of education: N/A     Social History Main Topics     Smoking status: Former Smoker     Smokeless tobacco: Never Used     Alcohol use No     Drug use: Yes     Special: Marijuana     Sexual activity: Not on file     Other Topics Concern     Not on file     Social History Narrative     No narrative on file         Review of Systems  Currently denies chills fever coughing wheezing chest pain dizziness or vertigo nausea vomiting diarrhea flulike symptoms rashes sore stiff neck or swollen glands.  History of diabetes hypertension hypothyroidism BPH depression hyperlipidemia      Current Outpatient Prescriptions:      aspirin 81 mg chewable tablet, Chew 81 mg daily., Disp: , Rfl:      cholecalciferol, vitamin D3, 1,000 unit tablet, Take 1,000 Units by mouth 2 (two) times a day., Disp: , Rfl:      dimethicone 1.5 % Crea, Apply 1 application topically 2 (two) times a day., Disp: , Rfl:      DULoxetine (CYMBALTA) 30 MG capsule, Take 30 mg by mouth daily., Disp: , Rfl:      folic acid (FOLVITE) 1 MG tablet, Take 1 mg by mouth daily., Disp: , Rfl:      gabapentin (NEURONTIN) 300 MG capsule, Take 300 mg by mouth 3 (three) times a day., Disp: , Rfl:      glucagon, human recombinant, 1 mg injection, Inject 1 mg as directed once as needed., Disp: , Rfl:      insulin aspart (NOVOLOG) 100 unit/mL injection, Inject 8 Units under the skin 3 (three) times a day., Disp: , Rfl:      insulin aspart (NOVOLOG) 100 unit/mL injection, Inject under the skin. Per Sliding Scale; If Blood Sugar is less than 150, call MD. If Blood Sugar is 151 to 200, give 1 Units. If Blood Sugar is 201 to 250, give 2 Units. If Blood Sugar is 251 to 300, give 3 Units. If Blood Sugar is greater than 300, give 4 Units., Disp: , Rfl:      insulin glargine (LANTUS) 100 unit/mL injection, Inject 22 Units under the skin daily.,  Disp: , Rfl:      levothyroxine (SYNTHROID, LEVOTHROID) 50 MCG tablet, Take 50 mcg by mouth daily., Disp: , Rfl:      magnesium oxide (MAG-OX) 400 mg tablet, Take 400 mg by mouth daily., Disp: , Rfl:      simvastatin (ZOCOR) 20 MG tablet, Take 20 mg by mouth at bedtime., Disp: , Rfl:      thiamine 100 MG tablet, Take 100 mg by mouth daily., Disp: , Rfl:     .There were no vitals filed for this visit.  Blood pressure 124/70 pulse 90 respirations 16 temperature 97.6  Physical Exam  Constitutional: He is oriented to person, place, and time. No distress.   HENT:   Head: Normocephalic.    Neck: Neck supple. No thyromegaly present.   Cardiovascular: Normal rate, regular rhythm and normal heart sounds.    Pulmonary/Chest: Breath sounds normal.   Abdominal: . There is no tenderness. There is no guarding.   Musculoskeletal:   Left foot does have good CMS he does have a amputated great toe.  No current secondary infection.  PICC line right bicep.  Able to work on therapy.  Pain is managed   Lymphadenopathy:     He has no cervical adenopathy.   Neurological: He is oriented to person, place, and time.   Skin: Skin is warm and dry. No rash noted.     LABS:   Lab Results   Component Value Date    WBC 4.4 11/13/2017    HGB 10.7 (L) 11/13/2017    HCT 33.2 (L) 11/13/2017    MCV 90 11/13/2017     11/13/2017     Lab Results   Component Value Date    CREATININE 1.03 11/13/2017         ASSESSMENT:      ICD-10-CM    1. Ankle osteomyelitis M86.9    2. Hypertension I10    3. Diabetes mellitus E11.9    4. Physical debility R53.81        PLAN:    At this time no further changes are necessary.  He has been actually doing quite wellAnd staff will keep me updated for any other issues but does see endocrinology today regarding his normal follow-up for diabetes and his thyroid.  Does see infectious disease in the surgeon next week.  .    Electronically signed by: Michael Duane Johnson, CNP

## 2021-06-14 NOTE — PROGRESS NOTES
[facility]    Facility:   [facility] [[id number]]   Code Status: FULL CODE      CHIEF COMPLAINT/REASON FOR VISIT:  Chief Complaint   Patient presents with     Follow Up     rehab, foot, labs.       HISTORY:      HPI: Juan is a 67 y.o. male who I had a chance to revisit with today secondary to his hospitalization October 27 - October 31, 2017 secondary to chronic osteomyelitis/abscess of his left foot.  His last visit to the wound doctor was November 21 still nonweightbearing.  He is going to continue with the IV antibiotics.  His next visit is next week he is hoping to get increased activities and progression of his rehabilitation process and status.  He has been normotensive and afebrile.  Also his blood sugars are less than 160 there were a couple in the low 200 range otherwise he has been asymptomatic.  No pain issues.  He does seem to be keeping pretty good spirits considering his prolonged hospitalization as well as stay on the transitional care unit.  Had a chance to talk about some current events.  He did see endocrinology secondary to his diabetes and his thyroid.  Denies heartburn or reflux.  No BPH problems.    Past Medical History:   Diagnosis Date     Acute renal failure      Depression      Diabetes mellitus type 1      Diabetic ulcer of left foot      DKA (diabetic ketoacidosis)      History of alcohol abuse      HLD (hyperlipidemia)      Hyperkalemia      Hypothyroidism      Metabolic encephalopathy              Family History   Problem Relation Age of Onset     No Medical Problems Mother      No Medical Problems Father      No Medical Problems Sister      No Medical Problems Brother      No Medical Problems Maternal Grandmother      Glaucoma Paternal Uncle      Macular degeneration Paternal Uncle      Social History     Social History     Marital status:      Spouse name: N/A     Number of children: N/A     Years of education: N/A     Social  History Main Topics     Smoking status: Former Smoker     Smokeless tobacco: Never Used     Alcohol use No     Drug use: Yes     Special: Marijuana     Sexual activity: Not on file     Other Topics Concern     Not on file     Social History Narrative     No narrative on file         Review of Systems  Currently denies chills fever coughing wheezing chest pain dizziness or vertigo nausea vomiting diarrhea flulike symptoms rashes sore stiff neck or swollen glands.  History of diabetes hypertension hypothyroidism BPH depression hyperlipidemia      Current Outpatient Prescriptions:      aspirin 81 mg chewable tablet, Chew 81 mg daily., Disp: , Rfl:      cholecalciferol, vitamin D3, 1,000 unit tablet, Take 1,000 Units by mouth 2 (two) times a day., Disp: , Rfl:      dimethicone 1.5 % Crea, Apply 1 application topically 2 (two) times a day., Disp: , Rfl:      DULoxetine (CYMBALTA) 30 MG capsule, Take 30 mg by mouth daily., Disp: , Rfl:      folic acid (FOLVITE) 1 MG tablet, Take 1 mg by mouth daily., Disp: , Rfl:      gabapentin (NEURONTIN) 300 MG capsule, Take 300 mg by mouth 3 (three) times a day., Disp: , Rfl:      glucagon, human recombinant, 1 mg injection, Inject 1 mg as directed once as needed., Disp: , Rfl:      insulin aspart (NOVOLOG) 100 unit/mL injection, Inject 8 Units under the skin 3 (three) times a day., Disp: , Rfl:      insulin aspart (NOVOLOG) 100 unit/mL injection, Inject under the skin. Per Sliding Scale; If Blood Sugar is less than 150, call MD. If Blood Sugar is 151 to 200, give 1 Units. If Blood Sugar is 201 to 250, give 2 Units. If Blood Sugar is 251 to 300, give 3 Units. If Blood Sugar is greater than 300, give 4 Units., Disp: , Rfl:      insulin glargine (LANTUS) 100 unit/mL injection, Inject 22 Units under the skin daily., Disp: , Rfl:      levothyroxine (SYNTHROID, LEVOTHROID) 50 MCG tablet, Take 50 mcg by mouth daily., Disp: , Rfl:      magnesium oxide (MAG-OX) 400 mg tablet, Take 400 mg by  mouth daily., Disp: , Rfl:      simvastatin (ZOCOR) 20 MG tablet, Take 20 mg by mouth at bedtime., Disp: , Rfl:      thiamine 100 MG tablet, Take 100 mg by mouth daily., Disp: , Rfl:     .There were no vitals filed for this visit.  Blood pressure 124/78 pulse 87 respirations 20 temperature 97.3  Physical Exam  Constitutional: He is oriented to person, place, and time. No distress.   HENT:   Head: Normocephalic.    Neck: Neck supple. No thyromegaly present.   Cardiovascular: Normal rate, regular rhythm and normal heart sounds.    Pulmonary/Chest: Breath sounds normal.   Abdominal: . There is no tenderness. There is no guarding.   Musculoskeletal:   Left foot does have good CMS he does have a amputated great toe.  No current secondary infection.  PICC line right bicep.  Able to work on therapy.  Pain is managed   Lymphadenopathy:     He has no cervical adenopathy.   Neurological: He is oriented to person, place, and time.         LABS:   Lab Results   Component Value Date    WBC 3.8 (L) 11/27/2017    HGB 10.4 (L) 11/27/2017    HCT 31.9 (L) 11/27/2017    MCV 88 11/27/2017     11/27/2017     Results for orders placed or performed in visit on 11/06/17   Basic Metabolic Panel   Result Value Ref Range    Sodium 139 136 - 145 mmol/L    Potassium 4.6 3.5 - 5.0 mmol/L    Chloride 103 98 - 107 mmol/L    CO2 28 22 - 31 mmol/L    Anion Gap, Calculation 8 5 - 18 mmol/L    Glucose 53 (LL) 70 - 125 mg/dL    Calcium 9.1 8.5 - 10.5 mg/dL    BUN 25 (H) 8 - 22 mg/dL    Creatinine 0.99 0.70 - 1.30 mg/dL    GFR MDRD Af Amer >60 >60 mL/min/1.73m2    GFR MDRD Non Af Amer >60 >60 mL/min/1.73m2       Lab Results   Component Value Date    CRP 0.1 11/27/2017         ASSESSMENT:      ICD-10-CM    1. Osteomyelitis of ankle or foot M86.9    2. Diabetes mellitus E11.9    3. Hypertension I10    4. Neuropathy G62.9        PLAN:    1 over his laboratory studies scheduled for today.  They are looking impressive.  I am thinking by next week when  he does meet the surgeon I think he will have good results good information and staff will keep me updated of his other problems or concerns.    .    Electronically signed by: Michael Duane Johnson, CNP

## 2021-06-14 NOTE — PROGRESS NOTES
[facility]    Facility:   [facility] [[id number]]   Code Status: FULL CODE      CHIEF COMPLAINT/REASON FOR VISIT:  Chief Complaint   Patient presents with     Follow Up     fib resection, abcess       HISTORY:      HPI: Juan is a 67 y.o. male who I had the pleasure to revisit with again today secondary to chronic osteomyelitis of the left ankle.  He is getting IV antibiotics.  He did see the surgeon earlier this week they are going to continue with strict restrictions regarding no ambulation on that right foot and continue with IV antibiotics and I believe he sees infectious disease next week.  He did see endocrinology also about a week ago for his diabetes and his thyroid his current blood sugars for the most part less than 170 there are couple above 200 but overall pretty well controlled.  Afebrile and also on room air.  No pain issues.  Regular diet.  No BPH problems no heartburn or reflux.  Moods have been stable.    Past Medical History:   Diagnosis Date     Acute renal failure      Depression      Diabetes mellitus type 1      Diabetic ulcer of left foot      DKA (diabetic ketoacidosis)      History of alcohol abuse      HLD (hyperlipidemia)      Hyperkalemia      Hypothyroidism      Metabolic encephalopathy              Family History   Problem Relation Age of Onset     No Medical Problems Mother      No Medical Problems Father      No Medical Problems Sister      No Medical Problems Brother      No Medical Problems Maternal Grandmother      Glaucoma Paternal Uncle      Macular degeneration Paternal Uncle      Social History     Social History     Marital status:      Spouse name: N/A     Number of children: N/A     Years of education: N/A     Social History Main Topics     Smoking status: Former Smoker     Smokeless tobacco: Never Used     Alcohol use No     Drug use: Yes     Special: Marijuana     Sexual activity: Not on file     Other Topics Concern      Not on file     Social History Narrative     No narrative on file         Review of Systems  Currently denies chills fever coughing wheezing chest pain dizziness or vertigo nausea vomiting diarrhea flulike symptoms rashes sore stiff neck or swollen glands.  History of diabetes hypertension hypothyroidism BPH depression hyperlipidemia      Current Outpatient Prescriptions:      aspirin 81 mg chewable tablet, Chew 81 mg daily., Disp: , Rfl:      cholecalciferol, vitamin D3, 1,000 unit tablet, Take 1,000 Units by mouth 2 (two) times a day., Disp: , Rfl:      dimethicone 1.5 % Crea, Apply 1 application topically 2 (two) times a day., Disp: , Rfl:      DULoxetine (CYMBALTA) 30 MG capsule, Take 30 mg by mouth daily., Disp: , Rfl:      folic acid (FOLVITE) 1 MG tablet, Take 1 mg by mouth daily., Disp: , Rfl:      gabapentin (NEURONTIN) 300 MG capsule, Take 300 mg by mouth 3 (three) times a day., Disp: , Rfl:      glucagon, human recombinant, 1 mg injection, Inject 1 mg as directed once as needed., Disp: , Rfl:      insulin aspart (NOVOLOG) 100 unit/mL injection, Inject 8 Units under the skin 3 (three) times a day., Disp: , Rfl:      insulin aspart (NOVOLOG) 100 unit/mL injection, Inject under the skin. Per Sliding Scale; If Blood Sugar is less than 150, call MD. If Blood Sugar is 151 to 200, give 1 Units. If Blood Sugar is 201 to 250, give 2 Units. If Blood Sugar is 251 to 300, give 3 Units. If Blood Sugar is greater than 300, give 4 Units., Disp: , Rfl:      insulin glargine (LANTUS) 100 unit/mL injection, Inject 22 Units under the skin daily., Disp: , Rfl:      levothyroxine (SYNTHROID, LEVOTHROID) 50 MCG tablet, Take 50 mcg by mouth daily., Disp: , Rfl:      magnesium oxide (MAG-OX) 400 mg tablet, Take 400 mg by mouth daily., Disp: , Rfl:      simvastatin (ZOCOR) 20 MG tablet, Take 20 mg by mouth at bedtime., Disp: , Rfl:      thiamine 100 MG tablet, Take 100 mg by mouth daily., Disp: , Rfl:     .There were no  vitals filed for this visit.  Blood pressure 105/65 pulse 87 respirations 16 temperature 97.4  Physical Exam  Constitutional: He is oriented to person, place, and time. No distress.   HENT:   Head: Normocephalic.    Neck: Neck supple. No thyromegaly present.   Cardiovascular: Normal rate, regular rhythm and normal heart sounds.    Pulmonary/Chest: Breath sounds normal.   Abdominal: . There is no tenderness. There is no guarding.   Musculoskeletal:   Left foot does have good CMS he does have a amputated great toe.  No current secondary infection.  PICC line right bicep.  Able to work on therapy.  Pain is managed   Lymphadenopathy:     He has no cervical adenopathy.   Neurological: He is oriented to person, place, and time.       LABS:   Lab Results   Component Value Date    WBC 4.3 11/20/2017    HGB 11.1 (L) 11/20/2017    HCT 34.0 (L) 11/20/2017    MCV 87 11/20/2017     11/20/2017     Lab Results   Component Value Date    CRP 0.1 11/20/2017     Results for orders placed or performed in visit on 11/06/17   Basic Metabolic Panel   Result Value Ref Range    Sodium 139 136 - 145 mmol/L    Potassium 4.6 3.5 - 5.0 mmol/L    Chloride 103 98 - 107 mmol/L    CO2 28 22 - 31 mmol/L    Anion Gap, Calculation 8 5 - 18 mmol/L    Glucose 53 (LL) 70 - 125 mg/dL    Calcium 9.1 8.5 - 10.5 mg/dL    BUN 25 (H) 8 - 22 mg/dL    Creatinine 0.99 0.70 - 1.30 mg/dL    GFR MDRD Af Amer >60 >60 mL/min/1.73m2    GFR MDRD Non Af Amer >60 >60 mL/min/1.73m2         ASSESSMENT:      ICD-10-CM    1. Osteomyelitis of ankle or foot M86.9    2. Hypertension I10    3. Diabetes mellitus E11.9    4. Neuropathy G62.9        PLAN:    No other changes at this time he does see the specialist he is getting good care.  Once again nonweightbearing to the left leg  Continue with the IV antibiotics surgical site remains stable sutures are still in place and his last visit was November 21 he does have 2 more weeks before the next  follow-up.    .    Electronically signed by: Michael Duane Johnson, CNP

## 2021-06-14 NOTE — PROGRESS NOTES
[facility]    Facility:   [facility] [[id number]]   Code Status: FULL CODE  PCP: No Primary Care Provider   Phone: None   Fax: [phone]      CHIEF COMPLAINT/REASON FOR VISIT:  Chief Complaint   Patient presents with     Discharge Summary       HISTORY COURSE:  Juan is a 67 y.o. male who was admitted to [facility] on October 14 and transferred to this facility in October 17, 2017.  He is a very nice 67-year-old male with diabetes insulin requiring, depression, hypothyroidism and hyperlipidemia.  He is a previous smoker and an alcoholic beverage drinker and quit these habits many years ago.  He currently lives with his daughter.  He is retired.  He was admitted on account of symptoms consistent with diabetic ketoacidosis.  He was nauseated vomiting and was dehydrated.      This happened in the background of his stopping his depression medication as well as insulin in the days prior to his admission.      He fulfills the criteria for diabetic ketoacidosis on admission.  He was treated aggressively with IV fluids and electrolyte support and resumed his insulin regimen.  Endocrine saw her in and recommended to continue Lantus 22 units at noon and 8 units of NovoLog 3 times daily with meals.      He suffered from acute kidney injury with creatinine of 2.3 on admission but this improved with hydration coming back down to normal levels on discharge.      He also has been having issues with an ulcer on the left ankle for 2 weeks prior to his admission.  He normally sees Dr. Spain his podiatrist.  Was seen in the hospital, and in fact is being planned for debridement procedure on Friday, 10/27/2017.      Previous surgeries include left great toe amputation and previous debridements on nonhealing wounds.  No easy bruisability no bleeding tendencies.  No history of untoward reaction to anesthesia.  Family history include coronary artery disease with his father dying  at the age of 63 had breast cancer in his mom at age of 56.      He himself does not have any history of coronary artery disease stroke aortic disease.      He does do pretty well with stairs not having dyspnea on exertion with one flight of stairs.  No chest pains no palpitations.  Appetite is good.  No urinary issues.  He takes a baby aspirin once a day for cardioprotection.  He is transferred here on cephalexin 500 mg 4 times a day for that left ankle ulcer/infection.          Since he has been here, he has had several hypoglycemic episodes typically at dinnertime at about 3 to 4:00 in the afternoon.  [address] down to 54 to other times they were in the mid 90s and one time it was 180.  Otherwise fasting lunch and at bedtime blood sugars fluctuate between 91 2/200.      He went to see his foot and ankle provider is who planned for a left distal fibula culture, incision and drainage in the left foot/ankle and partial resection of fibula for pressure reduction and excision of chronic wound.  A flap closure was also performed.     In the perioperative period, he suffered from acute kidney injury and urinary retention.  Alfuzosin and finasteride were both started.  Which improved urinary stream significantly.  He had to be straight cathed twice in the hospital but none since he has been back.  He was placed on carb-based insulin regimen plus his Lantus split between the morning and night in the hospital, however he is not knowledgeable about carb based insulin regimen and will not be able to follow through with this regimen at home.  In the hospital even with this regimen he was still having highs and lows and sometimes goes even in the 40s-50s.  He is not particularly excited about this idea and would like to get back to his old regimen of Lantus plus pre-meal short acting insulin.  He has been successful performing therapy recommendations while being on the transitional care unit certainly limited due to  nonweightbearing to his left foot.  He has been getting IV antibiotics through a PICC line he has been asymptomatic regarding the IV antibiotics and other words tolerating it quite well.  Laboratory studies have been appropriate.  He did go out for his normal endocrinology visit regarding his diabetes they made some small changes to his insulin currently Lantus is at 11 units at 8 PM 12 units in the morning 8 units before dinner 9 units at breakfast and 4 units at lunch.  He otherwise not having any discomfort we will go ahead and discontinue the oxycodone as needed due to nonuse.  He does self propel his wheelchair throughout the facility.  He has been normotensive and afebrile.       Review of Systems  Currently denies chills fever coughing wheezing chest pain dizziness or vertigo nausea vomiting diarrhea flulike symptoms rashes sore stiff neck or swollen glands.  History of diabetes hypertension hypothyroidism BPH depression hyperlipidemia  Vitals:    12/05/17 1321   BP: 122/68   Pulse: 92   Resp: 14   Temp: 97.6  F (36.4  C)   SpO2: 99%       Physical Exam  Constitutional: He is oriented to person, place, and time. No distress.   HENT:   Head: Normocephalic.    Neck: Neck supple. No thyromegaly present.   Cardiovascular: Normal rate, regular rhythm and normal heart sounds.    Pulmonary/Chest: Breath sounds normal.   Abdominal: . There is no tenderness. There is no guarding.   Musculoskeletal:   Left foot does have good CMS he does have a amputated great toe.  No current secondary infection.  PICC line right bicep.  Able to work on therapy.  Pain is managed   Lymphadenopathy:     He has no cervical adenopathy.   Neurological: He is oriented to person, place, and time.           Lab Results   Component Value Date    WBC 3.5 (L) 12/04/2017    HGB 11.2 (L) 12/04/2017    HCT 34.0 (L) 12/04/2017    MCV 86 12/04/2017     12/04/2017     Results for orders placed or performed in visit on 12/04/17   Basic Metabolic  Panel   Result Value Ref Range    Sodium 141 136 - 145 mmol/L    Potassium 4.2 3.5 - 5.0 mmol/L    Chloride 105 98 - 107 mmol/L    CO2 28 22 - 31 mmol/L    Anion Gap, Calculation 8 5 - 18 mmol/L    Glucose 68 (L) 70 - 125 mg/dL    Calcium 9.4 8.5 - 10.5 mg/dL    BUN 39 (H) 8 - 22 mg/dL    Creatinine 1.20 0.70 - 1.30 mg/dL    GFR MDRD Af Amer >60 >60 mL/min/1.73m2    GFR MDRD Non Af Amer 60 (L) >60 mL/min/1.73m2       Lab Results   Component Value Date    ALT 48 (H) 12/04/2017    AST 35 12/04/2017    ALKPHOS 83 12/04/2017    BILITOT 0.3 12/04/2017       MEDICATION LIST:  Current Outpatient Prescriptions   Medication Sig     aspirin 81 mg chewable tablet Chew 81 mg daily.     cholecalciferol, vitamin D3, 1,000 unit tablet Take 1,000 Units by mouth 2 (two) times a day.     dimethicone 1.5 % Crea Apply 1 application topically 2 (two) times a day.     DULoxetine (CYMBALTA) 30 MG capsule Take 30 mg by mouth daily.     folic acid (FOLVITE) 1 MG tablet Take 1 mg by mouth daily.     gabapentin (NEURONTIN) 300 MG capsule Take 300 mg by mouth 3 (three) times a day.     glucagon, human recombinant, 1 mg injection Inject 1 mg as directed once as needed.     insulin aspart (NOVOLOG) 100 unit/mL injection Inject 8 Units under the skin 3 (three) times a day.     insulin aspart (NOVOLOG) 100 unit/mL injection Inject under the skin. Per Sliding Scale; If Blood Sugar is less than 150, call MD.  If Blood Sugar is 151 to 200, give 1 Units.  If Blood Sugar is 201 to 250, give 2 Units.  If Blood Sugar is 251 to 300, give 3 Units.  If Blood Sugar is greater than 300, give 4 Units.     insulin glargine (LANTUS) 100 unit/mL injection Inject 12 Units under the skin daily.      levothyroxine (SYNTHROID, LEVOTHROID) 50 MCG tablet Take 50 mcg by mouth daily.     magnesium oxide (MAG-OX) 400 mg tablet Take 400 mg by mouth daily.     simvastatin (ZOCOR) 20 MG tablet Take 20 mg by mouth at bedtime.     thiamine 100 MG tablet Take 100 mg by mouth  daily.       DISCHARGE DIAGNOSIS:    ICD-10-CM    1. Osteomyelitis of ankle or foot M86.9    2. Diabetes mellitus E11.9    3. General weakness R53.1    4. Hypertension I10        MEDICAL EQUIPMENT NEEDS:  none    DISCHARGE PLAN/FACE TO FACE:  I certify that services are/were furnished while this patient was under the care of a physician and that a physician or an allowed non-physician practitioner (NPP), had a face-to-face encounter that meets the physician face-to-face encounter requirements. The encounter was in whole, or in part, related to the primary reason for home health. The patient is confined to his/her home and needs intermittent skilled nursing, physical therapy, speech-language pathology, or the continued need for occupational therapy. A plan of care has been established by a physician and is periodically reviewed by a physician.  Date of Face-to-Face Encounter: December 5, 2017    I certify that, based on my findings, the following services are medically necessary home health services: Discharging to home with current medications he will also have [facility] care for physical and occupational therapy and nursing services for disease management    My clinical findings support the need for the above skilled services because: (Please write a brief narrative summary that describes what the RN, PT, SLP, or other services will be doing in the home. A list of diagnoses in this section does not meet the CMS requirements.)  Secondary to generalized debility medication management and IV antibiotics    This patient is homebound because: (Please write a brief narrative summary describing the functional limitations as to why this patient is homebound and specifically what makes this patient homebound.)  Secondary to chronic comorbid conditions    The patient is, or has been, under my care and I have initiated the establishment of the plan of care. This patient will be followed by a physician who will  periodically review the plan of care.  He will follow-up with the surgeon as previously arranged I believe that his next week.  He will follow-up with infectious disease along with his laboratory studies.  He will follow-up with his primary care doctor regarding medication management and he also follow-up with his endocrinologist looking at his diabetic and hypothyroidism care.  Through all this he does feel comfortable managing his care along with assessing for any problems or other concerns regarding his diabetes or his foot.  He did not have any further questions.    Discharge coordination of care greater than 30 minutes.    Electronically signed by: Michael Duane Johnson, CNP

## 2021-06-14 NOTE — PROGRESS NOTES
[facility]    Facility:   [facility] [[id number]]   Code Status: FULL CODE      CHIEF COMPLAINT/REASON FOR VISIT:  Chief Complaint   Patient presents with     Follow Up     foot,        HISTORY:      HPI: Juan is a 67 y.o. male who I had the pleasure to visit with again today secondary to his most recent hospitalization for chronic osteomyelitis of left ankle and was hospitalized October 20 7 October 31st.  Still getting his IV antibiotics through his PICC line he does meet with the surgeon today he is hoping to decrease some of his restrictions and start really working out again.  He has been on the TCU for quite some time.  Last week did see the endocrinology group regarding his blood sugars they made a couple of minor adjustments to his insulin.  Blood sugars are little erratic at times ranging anywhere from 90 up to 260 most of them less than 200.  He has been normotensive and afebrile and also on room air.  No particular pain.  He seems like things are going well at this time.  He is getting extra nutrient supplements along with a regular diet.     Past Medical History:   Diagnosis Date     Acute renal failure      Depression      Diabetes mellitus type 1      Diabetic ulcer of left foot      DKA (diabetic ketoacidosis)      History of alcohol abuse      HLD (hyperlipidemia)      Hyperkalemia      Hypothyroidism      Metabolic encephalopathy              Family History   Problem Relation Age of Onset     No Medical Problems Mother      No Medical Problems Father      No Medical Problems Sister      No Medical Problems Brother      No Medical Problems Maternal Grandmother      Glaucoma Paternal Uncle      Macular degeneration Paternal Uncle      Social History     Social History     Marital status:      Spouse name: N/A     Number of children: N/A     Years of education: N/A     Social History Main Topics     Smoking status: Former Smoker     Smokeless  tobacco: Never Used     Alcohol use No     Drug use: Yes     Special: Marijuana     Sexual activity: Not on file     Other Topics Concern     Not on file     Social History Narrative     No narrative on file         Review of Systems  Currently denies chills fever coughing wheezing chest pain dizziness or vertigo nausea vomiting diarrhea flulike symptoms rashes sore stiff neck or swollen glands.  History of diabetes hypertension hypothyroidism BPH depression hyperlipidemia    Current Outpatient Prescriptions   Medication Sig     aspirin 81 mg chewable tablet Chew 81 mg daily.     cholecalciferol, vitamin D3, 1,000 unit tablet Take 1,000 Units by mouth 2 (two) times a day.     dimethicone 1.5 % Crea Apply 1 application topically 2 (two) times a day.     DULoxetine (CYMBALTA) 30 MG capsule Take 30 mg by mouth daily.     folic acid (FOLVITE) 1 MG tablet Take 1 mg by mouth daily.     gabapentin (NEURONTIN) 300 MG capsule Take 300 mg by mouth 3 (three) times a day.     glucagon, human recombinant, 1 mg injection Inject 1 mg as directed once as needed.     insulin aspart (NOVOLOG) 100 unit/mL injection Inject 8 Units under the skin 3 (three) times a day.     insulin aspart (NOVOLOG) 100 unit/mL injection Inject under the skin. Per Sliding Scale; If Blood Sugar is less than 150, call MD.  If Blood Sugar is 151 to 200, give 1 Units.  If Blood Sugar is 201 to 250, give 2 Units.  If Blood Sugar is 251 to 300, give 3 Units.  If Blood Sugar is greater than 300, give 4 Units.     insulin glargine (LANTUS) 100 unit/mL injection Inject 22 Units under the skin daily.     levothyroxine (SYNTHROID, LEVOTHROID) 50 MCG tablet Take 50 mcg by mouth daily.     magnesium oxide (MAG-OX) 400 mg tablet Take 400 mg by mouth daily.     simvastatin (ZOCOR) 20 MG tablet Take 20 mg by mouth at bedtime.     thiamine 100 MG tablet Take 100 mg by mouth daily.       .There were no vitals filed for this visit.  Blood pressure 106/70 pulse 96  respirations 18 temperature 97.2  Physical Exam  Constitutional: He is oriented to person, place, and time. No distress.   HENT:   Head: Normocephalic.    Neck: Neck supple. No thyromegaly present.   Cardiovascular: Normal rate, regular rhythm and normal heart sounds.    Pulmonary/Chest: Breath sounds normal.   Abdominal: . There is no tenderness. There is no guarding.   Musculoskeletal:   Left foot does have good CMS he does have a amputated great toe.  No current secondary infection.  PICC line right bicep.  Able to work on therapy.  Pain is managed   Lymphadenopathy:     He has no cervical adenopathy.   Neurological: He is oriented to person, place, and time.     LABS:   Lab Results   Component Value Date    WBC 4.3 11/20/2017    HGB 11.1 (L) 11/20/2017    HCT 34.0 (L) 11/20/2017    MCV 87 11/20/2017     11/20/2017     Results for orders placed or performed in visit on 11/06/17   Basic Metabolic Panel   Result Value Ref Range    Sodium 139 136 - 145 mmol/L    Potassium 4.6 3.5 - 5.0 mmol/L    Chloride 103 98 - 107 mmol/L    CO2 28 22 - 31 mmol/L    Anion Gap, Calculation 8 5 - 18 mmol/L    Glucose 53 (LL) 70 - 125 mg/dL    Calcium 9.1 8.5 - 10.5 mg/dL    BUN 25 (H) 8 - 22 mg/dL    Creatinine 0.99 0.70 - 1.30 mg/dL    GFR MDRD Af Amer >60 >60 mL/min/1.73m2    GFR MDRD Non Af Amer >60 >60 mL/min/1.73m2       No results found for: HGBA1C      ASSESSMENT:      ICD-10-CM    1. Hypertension I10    2. Diabetes mellitus E11.9    3. Ankle osteomyelitis M86.9    4. Physical debility R53.81        PLAN:    No other new changes.  Did see the endocrinologist last week does see the surgeon today he is hoping to have decreased restrictions and he can actually able to workout again and try to improve his overall stability and be discharged and to go back to home.        Electronically signed by: Michael Duane Johnson, CNP

## 2021-06-14 NOTE — PROGRESS NOTES
[facility]    [facility] [[id number]]   Code Status: FULL CODE      CHIEF COMPLAINT/REASON FOR VISIT:  Chief Complaint   Patient presents with     Follow Up     foot, abx       HISTORY:      HPI: Juan is a 67 y.o. male who I had the pleasure to visit with today secondary to his chronic medical conditions including his hospitalizations the most recent was October 27 - October 31, 2017 secondary to chronic osteomyelitis in particular with his left ankle wound and abscess.  He does have a PICC line getting IV antibiotics including laboratory studies.  He did see orthopedics as well as infectious disease twice this week is making good progress his next visit is not until about 2 more weeks.  He is not having any pain his blood sugars for the most part all have been normalized less than 200 there were a couple the mid 200 range.  He has been normotensive and afebrile.  For therapy he is transferring he is able to hop on his right foot.  His moods have been stable he also has hyperlipidemia and hypothyroidism.  His last BMP was on November 6.    Past Medical History:   Diagnosis Date     Acute renal failure      Depression      Diabetes mellitus type 1      Diabetic ulcer of left foot      DKA (diabetic ketoacidosis)      History of alcohol abuse      HLD (hyperlipidemia)      Hyperkalemia      Hypothyroidism      Metabolic encephalopathy              Family History   Problem Relation Age of Onset     No Medical Problems Mother      No Medical Problems Father      No Medical Problems Sister      No Medical Problems Brother      No Medical Problems Maternal Grandmother      Glaucoma Paternal Uncle      Macular degeneration Paternal Uncle      Social History     Social History     Marital status:      Spouse name: N/A     Number of children: N/A     Years of education: N/A     Social History Main Topics     Smoking status: Former Smoker     Smokeless  tobacco: Never Used     Alcohol use No     Drug use: Yes     Special: Marijuana     Sexual activity: Not on file     Other Topics Concern     Not on file     Social History Narrative     No narrative on file         Review of Systems  Currently denies chills fever coughing wheezing chest pain dizziness or vertigo nausea vomiting diarrhea flulike symptoms rashes sore stiff neck or swollen glands.  History of diabetes hypertension hypothyroidism BPH depression hyperlipidemia    Current Outpatient Prescriptions   Medication Sig     aspirin 81 mg chewable tablet Chew 81 mg daily.     cholecalciferol, vitamin D3, 1,000 unit tablet Take 1,000 Units by mouth 2 (two) times a day.     dimethicone 1.5 % Crea Apply 1 application topically 2 (two) times a day.     DULoxetine (CYMBALTA) 30 MG capsule Take 30 mg by mouth daily.     folic acid (FOLVITE) 1 MG tablet Take 1 mg by mouth daily.     gabapentin (NEURONTIN) 300 MG capsule Take 300 mg by mouth 3 (three) times a day.     glucagon, human recombinant, 1 mg injection Inject 1 mg as directed once as needed.     insulin aspart (NOVOLOG) 100 unit/mL injection Inject 8 Units under the skin 3 (three) times a day.     insulin aspart (NOVOLOG) 100 unit/mL injection Inject under the skin. Per Sliding Scale; If Blood Sugar is less than 150, call MD.  If Blood Sugar is 151 to 200, give 1 Units.  If Blood Sugar is 201 to 250, give 2 Units.  If Blood Sugar is 251 to 300, give 3 Units.  If Blood Sugar is greater than 300, give 4 Units.     insulin glargine (LANTUS) 100 unit/mL injection Inject 22 Units under the skin daily.     levothyroxine (SYNTHROID, LEVOTHROID) 50 MCG tablet Take 50 mcg by mouth daily.     magnesium oxide (MAG-OX) 400 mg tablet Take 400 mg by mouth daily.     simvastatin (ZOCOR) 20 MG tablet Take 20 mg by mouth at bedtime.     thiamine 100 MG tablet Take 100 mg by mouth daily.       .There were no vitals filed for this visit.  Blood pressure 132/77 pulse 88  respirations 20 temperature 97.1 saturation room air 99%  Physical Exam   Constitutional: He is oriented to person, place, and time. No distress.   HENT:   Head: Normocephalic.   Eyes: Pupils are equal, round, and reactive to light.   Neck: Neck supple. No thyromegaly present.   Cardiovascular: Normal rate, regular rhythm and normal heart sounds.    Pulmonary/Chest: Breath sounds normal.   Abdominal: Bowel sounds are normal. There is no tenderness. There is no guarding.   Musculoskeletal:   Left foot does have good CMS he does have a amputated great toe.  No current secondary infection.  PICC line right bicep.  Able to work on therapy.  Pain is managed   Lymphadenopathy:     He has no cervical adenopathy.   Neurological: He is oriented to person, place, and time.   Skin: Skin is warm and dry. No rash noted.         LABS:   Lab Results   Component Value Date    WBC 3.9 (L) 11/06/2017    HGB 9.9 (L) 11/06/2017    HCT 30.6 (L) 11/06/2017    MCV 87 11/06/2017     11/06/2017     No results found for: HGBA1C  Results for orders placed or performed in visit on 11/06/17   Basic Metabolic Panel   Result Value Ref Range    Sodium 139 136 - 145 mmol/L    Potassium 4.6 3.5 - 5.0 mmol/L    Chloride 103 98 - 107 mmol/L    CO2 28 22 - 31 mmol/L    Anion Gap, Calculation 8 5 - 18 mmol/L    Glucose 53 (LL) 70 - 125 mg/dL    Calcium 9.1 8.5 - 10.5 mg/dL    BUN 25 (H) 8 - 22 mg/dL    Creatinine 0.99 0.70 - 1.30 mg/dL    GFR MDRD Af Amer >60 >60 mL/min/1.73m2    GFR MDRD Non Af Amer >60 >60 mL/min/1.73m2       Lab Results   Component Value Date    ALT 55 (H) 11/06/2017    AST 34 11/06/2017    ALKPHOS 79 11/06/2017    BILITOT 0.2 11/06/2017     Lab Results   Component Value Date    CRP 0.7 11/06/2017         ASSESSMENT:      ICD-10-CM    1. Ankle osteomyelitis M86.9    2. Diabetes mellitus E11.9    3. Hypertension I10    4. General weakness R53.1    5. BPH (benign prostatic hyperplasia) N40.0    6. Hyperlipidemia E78.5    7.  Hypothyroidism E03.9        PLAN:    Overall making pretty good progress he does feel good about how he is handling things his next visit with infectious disease and orthopedics is not until December.  He will continue to manage and follow and work out.    .    Electronically signed by: Michael Duane Johnson, CNP

## 2021-06-16 PROBLEM — I10 HYPERTENSION: Status: ACTIVE | Noted: 2017-11-10

## 2021-06-16 PROBLEM — M86.9: Status: ACTIVE | Noted: 2017-11-10

## 2021-06-16 PROBLEM — G62.9 NEUROPATHY: Status: ACTIVE | Noted: 2017-11-24

## 2021-06-16 PROBLEM — E87.5 HYPERKALEMIA: Status: ACTIVE | Noted: 2017-10-21

## 2021-06-16 PROBLEM — E11.9 DIABETES MELLITUS (H): Status: ACTIVE | Noted: 2017-11-10

## 2021-06-16 PROBLEM — M86.9 OSTEOMYELITIS OF ANKLE OR FOOT: Status: ACTIVE | Noted: 2017-11-24

## 2021-06-16 PROBLEM — E03.9 HYPOTHYROIDISM: Status: ACTIVE | Noted: 2017-11-10

## 2021-06-16 PROBLEM — E78.5 HYPERLIPIDEMIA: Status: ACTIVE | Noted: 2017-11-10

## 2021-06-16 PROBLEM — N40.0 BPH (BENIGN PROSTATIC HYPERPLASIA): Status: ACTIVE | Noted: 2017-11-10

## 2021-06-16 PROBLEM — R53.1 GENERAL WEAKNESS: Status: ACTIVE | Noted: 2017-11-10
